# Patient Record
Sex: MALE | Race: WHITE | NOT HISPANIC OR LATINO | Employment: FULL TIME | URBAN - METROPOLITAN AREA
[De-identification: names, ages, dates, MRNs, and addresses within clinical notes are randomized per-mention and may not be internally consistent; named-entity substitution may affect disease eponyms.]

---

## 2023-11-06 ENCOUNTER — TELEMEDICINE (OUTPATIENT)
Dept: PRIMARY CARE | Facility: CLINIC | Age: 43
End: 2023-11-06
Payer: COMMERCIAL

## 2023-11-06 DIAGNOSIS — J01.10 ACUTE NON-RECURRENT FRONTAL SINUSITIS: Primary | ICD-10-CM

## 2023-11-06 PROCEDURE — 99442 PR PHYS/QHP TELEPHONE EVALUATION 11-20 MIN: CPT | Performed by: REGISTERED NURSE

## 2023-11-06 RX ORDER — HYDROCHLOROTHIAZIDE 25 MG/1
25 TABLET ORAL EVERY 24 HOURS
COMMUNITY
Start: 2023-04-20 | End: 2023-11-06 | Stop reason: WASHOUT

## 2023-11-06 RX ORDER — NADOLOL 40 MG/1
40 TABLET ORAL EVERY 24 HOURS
COMMUNITY
Start: 2021-12-22 | End: 2023-11-06 | Stop reason: WASHOUT

## 2023-11-06 ASSESSMENT — COLUMBIA-SUICIDE SEVERITY RATING SCALE - C-SSRS
1. IN THE PAST MONTH, HAVE YOU WISHED YOU WERE DEAD OR WISHED YOU COULD GO TO SLEEP AND NOT WAKE UP?: NO
2. HAVE YOU ACTUALLY HAD ANY THOUGHTS OF KILLING YOURSELF?: NO
6. HAVE YOU EVER DONE ANYTHING, STARTED TO DO ANYTHING, OR PREPARED TO DO ANYTHING TO END YOUR LIFE?: NO

## 2023-11-06 ASSESSMENT — PATIENT HEALTH QUESTIONNAIRE - PHQ9
SUM OF ALL RESPONSES TO PHQ9 QUESTIONS 1 AND 2: 0
2. FEELING DOWN, DEPRESSED OR HOPELESS: NOT AT ALL
1. LITTLE INTEREST OR PLEASURE IN DOING THINGS: NOT AT ALL

## 2023-11-06 ASSESSMENT — ENCOUNTER SYMPTOMS
LOSS OF SENSATION IN FEET: 0
SINUS PRESSURE: 1
DEPRESSION: 0
OCCASIONAL FEELINGS OF UNSTEADINESS: 0
COUGH: 1

## 2023-11-06 NOTE — PROGRESS NOTES
Subjective   Patient ID: Cirilo Yepez Jr. is a 43 y.o. male who presents for Sinusitis (Started Saturday with sinus pain pressure chest congestion productive cough. No fever).    Sinusitis  The current episode started in the past 7 days. The problem is unchanged. There has been no fever. His pain is at a severity of 2/10. The pain is mild. Associated symptoms include congestion, coughing and sinus pressure. Past treatments include nasal decongestants. The treatment provided no relief.        Review of Systems   HENT:  Positive for congestion and sinus pressure.    Respiratory:  Positive for cough.        Objective   There were no vitals taken for this visit.    Physical Exam  No exam, telehealth    Assessment/Plan   Problem List Items Addressed This Visit    None

## 2024-02-06 ENCOUNTER — OFFICE VISIT (OUTPATIENT)
Dept: PRIMARY CARE | Facility: CLINIC | Age: 44
End: 2024-02-06
Payer: COMMERCIAL

## 2024-02-06 VITALS
TEMPERATURE: 98.1 F | BODY MASS INDEX: 44.45 KG/M2 | WEIGHT: 315 LBS | SYSTOLIC BLOOD PRESSURE: 138 MMHG | OXYGEN SATURATION: 98 % | DIASTOLIC BLOOD PRESSURE: 82 MMHG | HEART RATE: 88 BPM | RESPIRATION RATE: 18 BRPM

## 2024-02-06 DIAGNOSIS — J30.2 SEASONAL ALLERGIES: Primary | ICD-10-CM

## 2024-02-06 PROBLEM — S80.10XA HEMATOMA OF LOWER EXTREMITY: Status: ACTIVE | Noted: 2024-02-06

## 2024-02-06 PROBLEM — T78.3XXA ANGIOEDEMA: Status: RESOLVED | Noted: 2024-02-06 | Resolved: 2024-02-06

## 2024-02-06 PROBLEM — Q66.6 VALGUS DEFORMITY OF BOTH FEET: Status: RESOLVED | Noted: 2024-02-06 | Resolved: 2024-02-06

## 2024-02-06 PROBLEM — I49.1 ATRIAL PREMATURE DEPOLARIZATION: Status: ACTIVE | Noted: 2024-02-06

## 2024-02-06 PROBLEM — I83.91 VARICOSE VEINS OF RIGHT LOWER EXTREMITY: Status: RESOLVED | Noted: 2024-02-06 | Resolved: 2024-02-06

## 2024-02-06 PROBLEM — K42.0 INCARCERATED UMBILICAL HERNIA: Status: RESOLVED | Noted: 2024-02-06 | Resolved: 2024-02-06

## 2024-02-06 PROBLEM — M17.11 OSTEOARTHRITIS OF RIGHT KNEE: Status: RESOLVED | Noted: 2024-02-06 | Resolved: 2024-02-06

## 2024-02-06 PROBLEM — E78.00 PURE HYPERCHOLESTEROLEMIA: Status: ACTIVE | Noted: 2024-02-06

## 2024-02-06 PROBLEM — M23.40 LOOSE BODY OF KNEE: Status: ACTIVE | Noted: 2024-02-06

## 2024-02-06 PROBLEM — I82.90 VENOUS THROMBOSIS: Status: ACTIVE | Noted: 2024-02-06

## 2024-02-06 PROBLEM — E66.01 MORBID OBESITY (MULTI): Status: RESOLVED | Noted: 2024-02-06 | Resolved: 2024-02-06

## 2024-02-06 PROBLEM — S86.111A: Status: RESOLVED | Noted: 2024-02-06 | Resolved: 2024-02-06

## 2024-02-06 PROBLEM — J32.9 CHRONIC SINUSITIS: Status: RESOLVED | Noted: 2024-02-06 | Resolved: 2024-02-06

## 2024-02-06 PROBLEM — I20.9 ANGINA PECTORIS (CMS-HCC): Status: ACTIVE | Noted: 2024-02-06

## 2024-02-06 PROBLEM — I47.10 SUPRAVENTRICULAR TACHYCARDIA (CMS-HCC): Status: ACTIVE | Noted: 2024-02-06

## 2024-02-06 PROBLEM — M94.20 CHONDROMALACIA: Status: RESOLVED | Noted: 2024-02-06 | Resolved: 2024-02-06

## 2024-02-06 PROBLEM — S96.811A: Status: RESOLVED | Noted: 2024-02-06 | Resolved: 2024-02-06

## 2024-02-06 PROBLEM — G47.30 BREATHING-RELATED SLEEP DISORDER: Status: RESOLVED | Noted: 2024-02-06 | Resolved: 2024-02-06

## 2024-02-06 PROCEDURE — 99212 OFFICE O/P EST SF 10 MIN: CPT | Performed by: REGISTERED NURSE

## 2024-02-06 PROCEDURE — 1036F TOBACCO NON-USER: CPT | Performed by: REGISTERED NURSE

## 2024-02-06 PROCEDURE — 2500000004 HC RX 250 GENERAL PHARMACY W/ HCPCS (ALT 636 FOR OP/ED): Performed by: REGISTERED NURSE

## 2024-02-06 PROCEDURE — 96372 THER/PROPH/DIAG INJ SC/IM: CPT | Performed by: REGISTERED NURSE

## 2024-02-06 RX ORDER — CYCLOBENZAPRINE HCL 10 MG
TABLET ORAL EVERY 24 HOURS
COMMUNITY
Start: 2022-06-28 | End: 2024-02-20 | Stop reason: WASHOUT

## 2024-02-06 RX ORDER — TIZANIDINE 4 MG/1
4 TABLET ORAL EVERY 8 HOURS
COMMUNITY
Start: 2022-11-19 | End: 2024-02-20 | Stop reason: WASHOUT

## 2024-02-06 RX ORDER — TRIAMCINOLONE ACETONIDE 40 MG/ML
40 INJECTION, SUSPENSION INTRA-ARTICULAR; INTRAMUSCULAR ONCE
Status: COMPLETED | OUTPATIENT
Start: 2024-02-06 | End: 2024-02-06

## 2024-02-06 RX ADMIN — TRIAMCINOLONE ACETONIDE 40 MG: 40 INJECTION, SUSPENSION INTRA-ARTICULAR; INTRAMUSCULAR at 13:15

## 2024-02-06 ASSESSMENT — PATIENT HEALTH QUESTIONNAIRE - PHQ9
2. FEELING DOWN, DEPRESSED OR HOPELESS: NOT AT ALL
SUM OF ALL RESPONSES TO PHQ9 QUESTIONS 1 AND 2: 0
1. LITTLE INTEREST OR PLEASURE IN DOING THINGS: NOT AT ALL

## 2024-02-06 ASSESSMENT — ENCOUNTER SYMPTOMS
LOSS OF SENSATION IN FEET: 0
SINUS PRESSURE: 1
DEPRESSION: 0
OCCASIONAL FEELINGS OF UNSTEADINESS: 0

## 2024-02-06 ASSESSMENT — COLUMBIA-SUICIDE SEVERITY RATING SCALE - C-SSRS
2. HAVE YOU ACTUALLY HAD ANY THOUGHTS OF KILLING YOURSELF?: NO
6. HAVE YOU EVER DONE ANYTHING, STARTED TO DO ANYTHING, OR PREPARED TO DO ANYTHING TO END YOUR LIFE?: NO
1. IN THE PAST MONTH, HAVE YOU WISHED YOU WERE DEAD OR WISHED YOU COULD GO TO SLEEP AND NOT WAKE UP?: NO

## 2024-02-06 ASSESSMENT — PAIN SCALES - GENERAL: PAINLEVEL: 0-NO PAIN

## 2024-02-06 NOTE — PROGRESS NOTES
Subjective   Patient ID: Cirilo Yepez Jr. is a 43 y.o. male who presents for No chief complaint on file..    Seasonal allergies         Review of Systems   HENT:  Positive for sinus pressure.    All other systems reviewed and are negative.      Objective   /82   Pulse 88   Temp 36.7 °C (98.1 °F)   Resp 18   Wt (!) 157 kg (346 lb)   SpO2 98%   BMI 44.45 kg/m²     Physical Exam  Vitals reviewed.   HENT:      Nose: Rhinorrhea present.   Psychiatric:         Mood and Affect: Mood normal.         Behavior: Behavior normal.         Assessment/Plan   Problem List Items Addressed This Visit    None

## 2024-02-14 ENCOUNTER — PHARMACY VISIT (OUTPATIENT)
Dept: PHARMACY | Facility: CLINIC | Age: 44
End: 2024-02-14
Payer: COMMERCIAL

## 2024-02-14 PROCEDURE — RXMED WILLOW AMBULATORY MEDICATION CHARGE

## 2024-02-14 RX ORDER — AZITHROMYCIN 250 MG/1
TABLET, FILM COATED ORAL
Qty: 6 TABLET | Refills: 0 | OUTPATIENT
Start: 2024-02-14 | End: 2024-02-20 | Stop reason: WASHOUT

## 2024-02-20 ENCOUNTER — OFFICE VISIT (OUTPATIENT)
Dept: PRIMARY CARE | Facility: CLINIC | Age: 44
End: 2024-02-20
Payer: COMMERCIAL

## 2024-02-20 VITALS
WEIGHT: 315 LBS | BODY MASS INDEX: 41.75 KG/M2 | DIASTOLIC BLOOD PRESSURE: 86 MMHG | TEMPERATURE: 97.6 F | HEIGHT: 73 IN | SYSTOLIC BLOOD PRESSURE: 124 MMHG | OXYGEN SATURATION: 97 % | RESPIRATION RATE: 18 BRPM | HEART RATE: 74 BPM

## 2024-02-20 DIAGNOSIS — G89.29 CHRONIC RIGHT-SIDED LOW BACK PAIN WITH RIGHT-SIDED SCIATICA: ICD-10-CM

## 2024-02-20 DIAGNOSIS — M54.41 CHRONIC RIGHT-SIDED LOW BACK PAIN WITH RIGHT-SIDED SCIATICA: ICD-10-CM

## 2024-02-20 DIAGNOSIS — I49.1 ATRIAL PREMATURE DEPOLARIZATION: ICD-10-CM

## 2024-02-20 DIAGNOSIS — I47.10 SUPRAVENTRICULAR TACHYCARDIA (CMS-HCC): ICD-10-CM

## 2024-02-20 DIAGNOSIS — Z00.00 ROUTINE GENERAL MEDICAL EXAMINATION AT A HEALTH CARE FACILITY: Primary | ICD-10-CM

## 2024-02-20 DIAGNOSIS — E78.00 PURE HYPERCHOLESTEROLEMIA: ICD-10-CM

## 2024-02-20 PROBLEM — S80.10XA HEMATOMA OF LOWER EXTREMITY: Status: RESOLVED | Noted: 2024-02-06 | Resolved: 2024-02-20

## 2024-02-20 LAB
ALBUMIN SERPL-MCNC: 4.2 G/DL (ref 3.5–5)
ALP BLD-CCNC: 80 U/L (ref 35–125)
ALT SERPL-CCNC: 24 U/L (ref 5–40)
ANION GAP SERPL CALC-SCNC: 11 MMOL/L
AST SERPL-CCNC: 19 U/L (ref 5–40)
BASOPHILS # BLD AUTO: 0.05 X10*3/UL (ref 0–0.1)
BASOPHILS NFR BLD AUTO: 0.7 %
BILIRUB SERPL-MCNC: 0.5 MG/DL (ref 0.1–1.2)
BUN SERPL-MCNC: 14 MG/DL (ref 8–25)
CALCIUM SERPL-MCNC: 9.6 MG/DL (ref 8.5–10.4)
CHLORIDE SERPL-SCNC: 99 MMOL/L (ref 97–107)
CHOLEST SERPL-MCNC: 159 MG/DL (ref 133–200)
CHOLEST/HDLC SERPL: 4.7 {RATIO}
CO2 SERPL-SCNC: 28 MMOL/L (ref 24–31)
CREAT SERPL-MCNC: 0.9 MG/DL (ref 0.4–1.6)
EGFRCR SERPLBLD CKD-EPI 2021: >90 ML/MIN/1.73M*2
EOSINOPHIL # BLD AUTO: 0.12 X10*3/UL (ref 0–0.7)
EOSINOPHIL NFR BLD AUTO: 1.8 %
ERYTHROCYTE [DISTWIDTH] IN BLOOD BY AUTOMATED COUNT: 12.3 % (ref 11.5–14.5)
GLUCOSE SERPL-MCNC: 126 MG/DL (ref 65–99)
HCT VFR BLD AUTO: 45.6 % (ref 41–52)
HDLC SERPL-MCNC: 34 MG/DL
HGB BLD-MCNC: 14.8 G/DL (ref 13.5–17.5)
IMM GRANULOCYTES # BLD AUTO: 0.02 X10*3/UL (ref 0–0.7)
IMM GRANULOCYTES NFR BLD AUTO: 0.3 % (ref 0–0.9)
LDLC SERPL CALC-MCNC: 111 MG/DL (ref 65–130)
LYMPHOCYTES # BLD AUTO: 1.91 X10*3/UL (ref 1.2–4.8)
LYMPHOCYTES NFR BLD AUTO: 28.1 %
MCH RBC QN AUTO: 28.7 PG (ref 26–34)
MCHC RBC AUTO-ENTMCNC: 32.5 G/DL (ref 32–36)
MCV RBC AUTO: 88 FL (ref 80–100)
MONOCYTES # BLD AUTO: 0.45 X10*3/UL (ref 0.1–1)
MONOCYTES NFR BLD AUTO: 6.6 %
NEUTROPHILS # BLD AUTO: 4.25 X10*3/UL (ref 1.2–7.7)
NEUTROPHILS NFR BLD AUTO: 62.5 %
NRBC BLD-RTO: 0 /100 WBCS (ref 0–0)
PLATELET # BLD AUTO: 270 X10*3/UL (ref 150–450)
POTASSIUM SERPL-SCNC: 4.5 MMOL/L (ref 3.4–5.1)
PROT SERPL-MCNC: 7.1 G/DL (ref 5.9–7.9)
RBC # BLD AUTO: 5.16 X10*6/UL (ref 4.5–5.9)
SODIUM SERPL-SCNC: 138 MMOL/L (ref 133–145)
TRIGL SERPL-MCNC: 71 MG/DL (ref 40–150)
WBC # BLD AUTO: 6.8 X10*3/UL (ref 4.4–11.3)

## 2024-02-20 PROCEDURE — 85025 COMPLETE CBC W/AUTO DIFF WBC: CPT | Performed by: REGISTERED NURSE

## 2024-02-20 PROCEDURE — 80061 LIPID PANEL: CPT | Performed by: REGISTERED NURSE

## 2024-02-20 PROCEDURE — 80053 COMPREHEN METABOLIC PANEL: CPT | Performed by: REGISTERED NURSE

## 2024-02-20 PROCEDURE — 36415 COLL VENOUS BLD VENIPUNCTURE: CPT | Performed by: REGISTERED NURSE

## 2024-02-20 PROCEDURE — 1036F TOBACCO NON-USER: CPT | Performed by: REGISTERED NURSE

## 2024-02-20 PROCEDURE — 99396 PREV VISIT EST AGE 40-64: CPT | Performed by: REGISTERED NURSE

## 2024-02-20 RX ORDER — HYDROCHLOROTHIAZIDE 25 MG/1
25 TABLET ORAL DAILY
COMMUNITY
Start: 2023-04-20 | End: 2024-02-20 | Stop reason: WASHOUT

## 2024-02-20 ASSESSMENT — ENCOUNTER SYMPTOMS
LOSS OF SENSATION IN FEET: 0
DEPRESSION: 0
OCCASIONAL FEELINGS OF UNSTEADINESS: 0

## 2024-02-20 ASSESSMENT — COLUMBIA-SUICIDE SEVERITY RATING SCALE - C-SSRS
6. HAVE YOU EVER DONE ANYTHING, STARTED TO DO ANYTHING, OR PREPARED TO DO ANYTHING TO END YOUR LIFE?: NO
1. IN THE PAST MONTH, HAVE YOU WISHED YOU WERE DEAD OR WISHED YOU COULD GO TO SLEEP AND NOT WAKE UP?: NO
2. HAVE YOU ACTUALLY HAD ANY THOUGHTS OF KILLING YOURSELF?: NO

## 2024-02-20 ASSESSMENT — PAIN SCALES - GENERAL: PAINLEVEL: 0-NO PAIN

## 2024-02-20 NOTE — PROGRESS NOTES
"Subjective   Patient ID: Cirilo Yepez Jr. is a 43 y.o. male who presents for Annual Exam (Pt is fasting).    Pt here for PE and BW he also has low back pain from a previous injury.       Pt will be leaving practice to change to [employer] as wife just got job there  Review of Systems   All other systems reviewed and are negative.      Objective   /86   Pulse 74   Temp 36.4 °C (97.6 °F)   Resp 18   Ht 1.854 m (6' 1\")   Wt 149 kg (329 lb 6.4 oz)   SpO2 97%   BMI 43.46 kg/m²     Physical Exam  Vitals reviewed.   Constitutional:       Appearance: Normal appearance.   HENT:      Head: Normocephalic and atraumatic.      Right Ear: Tympanic membrane, ear canal and external ear normal.      Left Ear: Tympanic membrane, ear canal and external ear normal.      Nose: Nose normal.      Mouth/Throat:      Mouth: Mucous membranes are moist.   Eyes:      Extraocular Movements: Extraocular movements intact.      Pupils: Pupils are equal, round, and reactive to light.   Cardiovascular:      Rate and Rhythm: Normal rate and regular rhythm.      Pulses: Normal pulses.      Heart sounds: Normal heart sounds.   Pulmonary:      Effort: Pulmonary effort is normal.      Breath sounds: Normal breath sounds.   Abdominal:      General: Bowel sounds are normal.      Palpations: Abdomen is soft.   Musculoskeletal:         General: Normal range of motion.      Cervical back: Normal range of motion and neck supple.   Skin:     General: Skin is warm and dry.      Capillary Refill: Capillary refill takes less than 2 seconds.   Neurological:      General: No focal deficit present.      Mental Status: He is alert and oriented to person, place, and time.   Psychiatric:         Mood and Affect: Mood normal.         Behavior: Behavior normal.         Assessment/Plan   Problem List Items Addressed This Visit             ICD-10-CM    Angina pectoris (CMS/HCC) I20.9    Pure hypercholesterolemia E78.00    Atrial premature depolarization I49.1    " Supraventricular tachycardia I47.10     Other Visit Diagnoses         Codes    Routine general medical examination at a health care facility    -  Primary Z00.00    Chronic right-sided low back pain with right-sided sciatica     M54.41, G89.29

## 2024-03-13 ENCOUNTER — HOSPITAL ENCOUNTER (OUTPATIENT)
Dept: RADIOLOGY | Facility: CLINIC | Age: 44
Discharge: HOME | End: 2024-03-13
Payer: COMMERCIAL

## 2024-03-13 DIAGNOSIS — M54.41 CHRONIC RIGHT-SIDED LOW BACK PAIN WITH RIGHT-SIDED SCIATICA: ICD-10-CM

## 2024-03-13 DIAGNOSIS — G89.29 CHRONIC RIGHT-SIDED LOW BACK PAIN WITH RIGHT-SIDED SCIATICA: ICD-10-CM

## 2024-03-13 PROCEDURE — 72110 X-RAY EXAM L-2 SPINE 4/>VWS: CPT | Performed by: RADIOLOGY

## 2024-03-13 PROCEDURE — 72110 X-RAY EXAM L-2 SPINE 4/>VWS: CPT

## 2024-03-18 ENCOUNTER — PHARMACY VISIT (OUTPATIENT)
Dept: PHARMACY | Facility: CLINIC | Age: 44
End: 2024-03-18
Payer: COMMERCIAL

## 2024-03-18 ENCOUNTER — APPOINTMENT (OUTPATIENT)
Dept: RADIOLOGY | Facility: HOSPITAL | Age: 44
End: 2024-03-18
Payer: COMMERCIAL

## 2024-03-18 ENCOUNTER — HOSPITAL ENCOUNTER (EMERGENCY)
Facility: HOSPITAL | Age: 44
Discharge: HOME | End: 2024-03-18
Attending: FAMILY MEDICINE
Payer: COMMERCIAL

## 2024-03-18 VITALS
BODY MASS INDEX: 40.43 KG/M2 | OXYGEN SATURATION: 95 % | TEMPERATURE: 96.7 F | SYSTOLIC BLOOD PRESSURE: 132 MMHG | WEIGHT: 315 LBS | HEIGHT: 74 IN | DIASTOLIC BLOOD PRESSURE: 81 MMHG | HEART RATE: 83 BPM | RESPIRATION RATE: 18 BRPM

## 2024-03-18 DIAGNOSIS — M54.42 ACUTE RIGHT-SIDED LOW BACK PAIN WITH BILATERAL SCIATICA: ICD-10-CM

## 2024-03-18 DIAGNOSIS — M54.41 ACUTE RIGHT-SIDED LOW BACK PAIN WITH BILATERAL SCIATICA: ICD-10-CM

## 2024-03-18 DIAGNOSIS — M54.16 LUMBAR RADICULOPATHY, ACUTE: Primary | ICD-10-CM

## 2024-03-18 DIAGNOSIS — M51.9 LUMBAR DISC DISEASE: ICD-10-CM

## 2024-03-18 LAB
ALBUMIN SERPL BCP-MCNC: 4.4 G/DL (ref 3.4–5)
ALP SERPL-CCNC: 78 U/L (ref 33–120)
ALT SERPL W P-5'-P-CCNC: 21 U/L (ref 10–52)
ANION GAP SERPL CALC-SCNC: 10 MMOL/L (ref 10–20)
AST SERPL W P-5'-P-CCNC: 16 U/L (ref 9–39)
BASOPHILS # BLD AUTO: 0.06 X10*3/UL (ref 0–0.1)
BASOPHILS NFR BLD AUTO: 0.6 %
BILIRUB SERPL-MCNC: 0.7 MG/DL (ref 0–1.2)
BUN SERPL-MCNC: 13 MG/DL (ref 6–23)
CALCIUM SERPL-MCNC: 9.5 MG/DL (ref 8.6–10.3)
CHLORIDE SERPL-SCNC: 102 MMOL/L (ref 98–107)
CO2 SERPL-SCNC: 32 MMOL/L (ref 21–32)
CREAT SERPL-MCNC: 0.93 MG/DL (ref 0.5–1.3)
EGFRCR SERPLBLD CKD-EPI 2021: >90 ML/MIN/1.73M*2
EOSINOPHIL # BLD AUTO: 0.15 X10*3/UL (ref 0–0.7)
EOSINOPHIL NFR BLD AUTO: 1.4 %
ERYTHROCYTE [DISTWIDTH] IN BLOOD BY AUTOMATED COUNT: 12.7 % (ref 11.5–14.5)
GLUCOSE SERPL-MCNC: 115 MG/DL (ref 74–99)
HCT VFR BLD AUTO: 47.3 % (ref 41–52)
HGB BLD-MCNC: 15.6 G/DL (ref 13.5–17.5)
IMM GRANULOCYTES # BLD AUTO: 0.04 X10*3/UL (ref 0–0.7)
IMM GRANULOCYTES NFR BLD AUTO: 0.4 % (ref 0–0.9)
LACTATE SERPL-SCNC: 1.2 MMOL/L (ref 0.4–2)
LYMPHOCYTES # BLD AUTO: 2.06 X10*3/UL (ref 1.2–4.8)
LYMPHOCYTES NFR BLD AUTO: 19 %
MCH RBC QN AUTO: 29.1 PG (ref 26–34)
MCHC RBC AUTO-ENTMCNC: 33 G/DL (ref 32–36)
MCV RBC AUTO: 88 FL (ref 80–100)
MONOCYTES # BLD AUTO: 0.54 X10*3/UL (ref 0.1–1)
MONOCYTES NFR BLD AUTO: 5 %
NEUTROPHILS # BLD AUTO: 8.01 X10*3/UL (ref 1.2–7.7)
NEUTROPHILS NFR BLD AUTO: 73.6 %
NRBC BLD-RTO: 0 /100 WBCS (ref 0–0)
PLATELET # BLD AUTO: 219 X10*3/UL (ref 150–450)
POTASSIUM SERPL-SCNC: 3.8 MMOL/L (ref 3.5–5.3)
PROT SERPL-MCNC: 7.6 G/DL (ref 6.4–8.2)
RBC # BLD AUTO: 5.36 X10*6/UL (ref 4.5–5.9)
SODIUM SERPL-SCNC: 140 MMOL/L (ref 136–145)
WBC # BLD AUTO: 10.9 X10*3/UL (ref 4.4–11.3)

## 2024-03-18 PROCEDURE — 85025 COMPLETE CBC W/AUTO DIFF WBC: CPT | Performed by: FAMILY MEDICINE

## 2024-03-18 PROCEDURE — 72131 CT LUMBAR SPINE W/O DYE: CPT | Mod: RCN | Performed by: RADIOLOGY

## 2024-03-18 PROCEDURE — 36415 COLL VENOUS BLD VENIPUNCTURE: CPT | Performed by: FAMILY MEDICINE

## 2024-03-18 PROCEDURE — 74176 CT ABD & PELVIS W/O CONTRAST: CPT

## 2024-03-18 PROCEDURE — RXMED WILLOW AMBULATORY MEDICATION CHARGE

## 2024-03-18 PROCEDURE — 80053 COMPREHEN METABOLIC PANEL: CPT | Performed by: FAMILY MEDICINE

## 2024-03-18 PROCEDURE — 96374 THER/PROPH/DIAG INJ IV PUSH: CPT

## 2024-03-18 PROCEDURE — 96375 TX/PRO/DX INJ NEW DRUG ADDON: CPT

## 2024-03-18 PROCEDURE — 2500000004 HC RX 250 GENERAL PHARMACY W/ HCPCS (ALT 636 FOR OP/ED): Performed by: FAMILY MEDICINE

## 2024-03-18 PROCEDURE — 74176 CT ABD & PELVIS W/O CONTRAST: CPT | Mod: RCN | Performed by: RADIOLOGY

## 2024-03-18 PROCEDURE — 72131 CT LUMBAR SPINE W/O DYE: CPT | Mod: RCN

## 2024-03-18 PROCEDURE — 96361 HYDRATE IV INFUSION ADD-ON: CPT

## 2024-03-18 PROCEDURE — RXOTC WILLOW AMBULATORY OTC CHARGE

## 2024-03-18 PROCEDURE — 99285 EMERGENCY DEPT VISIT HI MDM: CPT | Mod: 25

## 2024-03-18 PROCEDURE — 83605 ASSAY OF LACTIC ACID: CPT | Performed by: FAMILY MEDICINE

## 2024-03-18 RX ORDER — KETOROLAC TROMETHAMINE 30 MG/ML
30 INJECTION, SOLUTION INTRAMUSCULAR; INTRAVENOUS ONCE
Status: COMPLETED | OUTPATIENT
Start: 2024-03-18 | End: 2024-03-18

## 2024-03-18 RX ORDER — ONDANSETRON HYDROCHLORIDE 2 MG/ML
4 INJECTION, SOLUTION INTRAVENOUS ONCE
Status: COMPLETED | OUTPATIENT
Start: 2024-03-18 | End: 2024-03-18

## 2024-03-18 RX ORDER — KETOROLAC TROMETHAMINE 15 MG/ML
15 INJECTION, SOLUTION INTRAMUSCULAR; INTRAVENOUS ONCE
Status: DISCONTINUED | OUTPATIENT
Start: 2024-03-18 | End: 2024-03-18

## 2024-03-18 RX ORDER — KETOROLAC TROMETHAMINE 10 MG/1
10 TABLET, FILM COATED ORAL EVERY 6 HOURS PRN
Qty: 20 TABLET | Refills: 0 | Status: SHIPPED | OUTPATIENT
Start: 2024-03-18 | End: 2024-03-23

## 2024-03-18 RX ADMIN — SODIUM CHLORIDE 500 ML: 9 INJECTION, SOLUTION INTRAVENOUS at 11:02

## 2024-03-18 RX ADMIN — KETOROLAC TROMETHAMINE 30 MG: 30 INJECTION, SOLUTION INTRAMUSCULAR at 11:16

## 2024-03-18 RX ADMIN — ONDANSETRON 4 MG: 2 INJECTION INTRAMUSCULAR; INTRAVENOUS at 11:16

## 2024-03-18 ASSESSMENT — PAIN DESCRIPTION - LOCATION
LOCATION: BACK
LOCATION: BACK

## 2024-03-18 ASSESSMENT — PAIN - FUNCTIONAL ASSESSMENT
PAIN_FUNCTIONAL_ASSESSMENT: 0-10
PAIN_FUNCTIONAL_ASSESSMENT: 0-10

## 2024-03-18 ASSESSMENT — PAIN DESCRIPTION - PAIN TYPE: TYPE: ACUTE PAIN

## 2024-03-18 ASSESSMENT — PAIN DESCRIPTION - ORIENTATION: ORIENTATION: LOWER

## 2024-03-18 ASSESSMENT — PAIN DESCRIPTION - PROGRESSION: CLINICAL_PROGRESSION: GRADUALLY WORSENING

## 2024-03-18 ASSESSMENT — PAIN SCALES - GENERAL: PAINLEVEL_OUTOF10: 10 - WORST POSSIBLE PAIN

## 2024-03-18 ASSESSMENT — PAIN DESCRIPTION - ONSET: ONSET: SUDDEN

## 2024-03-18 ASSESSMENT — PAIN DESCRIPTION - FREQUENCY: FREQUENCY: CONSTANT/CONTINUOUS

## 2024-03-18 ASSESSMENT — PAIN DESCRIPTION - DESCRIPTORS: DESCRIPTORS: SHARP

## 2024-03-18 NOTE — ED PROVIDER NOTES
HPI   No chief complaint on file.      HPI  This 44-year-old male patient came to the emergency room with complaint of severe low back pain, patient    He fell about 1 step down about a week ago fell down landing on low back and suffered low back injury, patient had low back x-ray done as outpatient which she think reported no fracture.  Continue pain however apparently symptoms got worse when he was getting up 90 basketed and Dr. Mccarty severe low back pain pain rating to buttocks area bilaterally he cannot move around because of severe COPD and even move the lower extremity causes severe pain in the low back and decided come to ER for hydration.  He has been taking Flexeril without improvement.  Denies any abdominal pain headache or neck stiffness chest pain or short of breath or fever or chills.  Denies income stool or urine.  Denies specimen from history of AAA or dissection or kidney stone.  Denies UTI symptoms.  He denies history of chronic back problem.  Urination has been good.  Denies history of peptic ulcers or renal problem.  He does not use any anti-inflammatory but denies any contraindication for inflammatory.    Family history: Reviewed  Social history: Reviewed, denies substance abuse.  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  Review of system: 10 review of system obtained review of system as described in HPI otherwise negative.            No data recorded                   Patient History   Past Medical History:   Diagnosis Date    Angioedema 02/06/2024    Breathing-related sleep disorder 02/06/2024    Chondromalacia 02/06/2024    Chronic sinusitis 02/06/2024    Hematoma of lower extremity 02/06/2024    Incarcerated umbilical hernia 02/06/2024    Morbid obesity (CMS/HCC) 02/06/2024    Osteoarthritis of right knee 02/06/2024    Rupture of right plantaris tendon 02/06/2024    Strain of right gastrocnemius muscle or tendon 02/06/2024    Valgus deformity of both feet 02/06/2024    Varicose  veins of right lower extremity 02/06/2024     Past Surgical History:   Procedure Laterality Date    OTHER SURGICAL HISTORY  12/28/2020    Shoulder surgery     Family History   Problem Relation Name Age of Onset    No Known Problems Mother      Heart disease Father      Diabetes Father      Diabetes Brother       Social History     Tobacco Use    Smoking status: Never     Passive exposure: Never    Smokeless tobacco: Never   Vaping Use    Vaping Use: Never used   Substance Use Topics    Alcohol use: Yes     Alcohol/week: 1.0 standard drink of alcohol     Types: 1 Standard drinks or equivalent per week    Drug use: Never   EKG obtained at 19 hours showed normal sinus rhythm with sinus arrhythmia rate of 77.  Nonspecific ST abnormality.  No ST-T evaluation.  No STEMI.  Abnormal EKG RI at this EKG.    Physical Exam   ED Triage Vitals   Temp Pulse Resp BP   -- -- -- --      SpO2 Temp src Heart Rate Source Patient Position   -- -- -- --      BP Location FiO2 (%)     -- --       Physical Exam  Constitutional:       Appearance: Normal appearance.      Comments: Patient is a well-developed well-nourished tall big beard male patient appears in moderate pain to be severe low back pain with spasm.  Any movement of the low back change of body posture, severe low back pain.  He was awake alert oriented x 3.  Talking breathing without acute distress or distress.   HENT:      Head: Normocephalic and atraumatic.      Right Ear: External ear normal.      Left Ear: External ear normal.      Nose: Nose normal. No congestion or rhinorrhea.   Eyes:      Extraocular Movements: Extraocular movements intact.      Conjunctiva/sclera: Conjunctivae normal.      Pupils: Pupils are equal, round, and reactive to light.   Neck:      Comments: Cervical thoracic and lumbar spine nontender he was noted severe low back spasm type pain.  Neck was supple.  Cardiovascular:      Rate and Rhythm: Normal rate and regular rhythm.      Pulses: Normal pulses.       Heart sounds: Normal heart sounds.      Comments: Regular rate and rhythm.  No friction rub no murmur no peripheral edema good peripheral pulses calf is nontender Homans' sign negative good skin perfusion.  Pulmonary:      Effort: Pulmonary effort is normal.      Breath sounds: Normal breath sounds.      Comments: Clear breath sounds bilaterally without wheezing rales rhonchi no tachypnea hypoxemia respite/talking breathing comfortably.  Abdominal:      General: Abdomen is flat. Bowel sounds are normal.      Palpations: Abdomen is soft.      Comments: Abdomen mildly obese positive bowel sound nontender no guarding rebound could not appreciate a pulsatile mass.   Musculoskeletal:      Cervical back: Normal range of motion and neck supple.      Comments: Noted a marked bronchospasm and pain in the low back spine nontender neck was supple and remove the lower part of the body because pain he was able to flex and extend his hip any with severe low back pain.  Dorsiflexion improved to 3+.  Both upper lower extremity good motion nontender except   Skin:     General: Skin is warm.      Capillary Refill: Capillary refill takes less than 2 seconds.      Comments: Normal skin turgor no ecchymosis petechia or red streak.   Neurological:      General: No focal deficit present.      Mental Status: He is alert and oriented to person, place, and time.      Comments: Neuro examination grossly within normal she was in moderate pain with low back spasm and pain with range of motion lower part of the body.  Spine nontender neck was supple both upper extremity good motion nontender dorsiflexion plantar foot and toes.  No nuchal rigidity.  Cranial nerves II through XII gross intact.  Limited examination reveals no cerebellar examination abnormality   Psychiatric:         Mood and Affect: Mood normal.         Behavior: Behavior normal.         ED Course & MDM   Diagnoses as of 03/18/24 1357   Lumbar disc disease   Lumbar  radiculopathy, acute   Acute right-sided low back pain with bilateral sciatica       Medical Decision Making  Patient who fell off 1 step suffered low back pain however got further aggravated when he was carrying laundry yesterday with severe low back pain she is treated with Botox.  Denies income stool or urine or any headache or neck stiffness fever chills cough nausea vomiting diarrhea.  Upon examination he was in moderate to severe.  Marked low back spasm and however cervical thoracic and lumbar spine nontender neck was supple lungs are clear heart regular rate and neurovascularly abdomen is obese soft nontender.  Range of motion hip and pelvic is initially limited due to patient being triaged and subsequently evaluated and his examination room and it was noted good range of motion legs intact with pulse intact sensation.  Patient's patient was in severe pain and required Toradol shot and we are repeating Flexeril.  CT of the abdomen pelvis without contrast CT of the lumbar spine reformatted images showed finding consistent with L5-S1 disc disease and some other incidental finding without any acute fracture or subluxation.  No acute intra-abdominal pelvic abnormality reported.    Patient is feeling much better after he has been given Toradol initially was denying pain but after he went to sleep following Toradol shot is feeling better still has some residual lumbar radiculopathy symptoms with history of Toradol to use only and do not use with ibuprofen or Naprosyn type products.  Continue Flexeril and follow-up with primary care physician and orthopedic surgery.  See transfer note.  If any problem concern return to ER.        Procedure  Procedures     Bryson Bermudez MD  03/18/24 2758

## 2024-03-19 ENCOUNTER — OFFICE VISIT (OUTPATIENT)
Dept: ORTHOPEDIC SURGERY | Facility: CLINIC | Age: 44
End: 2024-03-19
Payer: COMMERCIAL

## 2024-03-19 ENCOUNTER — PHARMACY VISIT (OUTPATIENT)
Dept: PHARMACY | Facility: CLINIC | Age: 44
End: 2024-03-19
Payer: COMMERCIAL

## 2024-03-19 ENCOUNTER — EVALUATION (OUTPATIENT)
Dept: PHYSICAL THERAPY | Facility: HOSPITAL | Age: 44
End: 2024-03-19
Payer: COMMERCIAL

## 2024-03-19 ENCOUNTER — APPOINTMENT (OUTPATIENT)
Dept: PRIMARY CARE | Facility: CLINIC | Age: 44
End: 2024-03-19
Payer: COMMERCIAL

## 2024-03-19 DIAGNOSIS — M54.50 ACUTE BILATERAL LOW BACK PAIN WITHOUT SCIATICA: Primary | ICD-10-CM

## 2024-03-19 PROCEDURE — 97110 THERAPEUTIC EXERCISES: CPT | Mod: GP | Performed by: PHYSICAL THERAPIST

## 2024-03-19 PROCEDURE — RXMED WILLOW AMBULATORY MEDICATION CHARGE

## 2024-03-19 PROCEDURE — 97161 PT EVAL LOW COMPLEX 20 MIN: CPT | Mod: GP | Performed by: PHYSICAL THERAPIST

## 2024-03-19 PROCEDURE — 99204 OFFICE O/P NEW MOD 45 MIN: CPT | Performed by: PHYSICIAN ASSISTANT

## 2024-03-19 RX ORDER — METHOCARBAMOL 500 MG/1
TABLET, FILM COATED ORAL
Qty: 60 TABLET | Refills: 2 | Status: SHIPPED | OUTPATIENT
Start: 2024-03-19

## 2024-03-19 RX ORDER — MELOXICAM 15 MG/1
15 TABLET ORAL DAILY
Qty: 30 TABLET | Refills: 3 | Status: SHIPPED | OUTPATIENT
Start: 2024-03-19

## 2024-03-19 ASSESSMENT — ENCOUNTER SYMPTOMS
OCCASIONAL FEELINGS OF UNSTEADINESS: 0
LOSS OF SENSATION IN FEET: 0
DEPRESSION: 0

## 2024-03-19 ASSESSMENT — PAIN SCALES - GENERAL: PAINLEVEL_OUTOF10: 7

## 2024-03-19 ASSESSMENT — PATIENT HEALTH QUESTIONNAIRE - PHQ9
SUM OF ALL RESPONSES TO PHQ9 QUESTIONS 1 AND 2: 0
1. LITTLE INTEREST OR PLEASURE IN DOING THINGS: NOT AT ALL
2. FEELING DOWN, DEPRESSED OR HOPELESS: NOT AT ALL

## 2024-03-19 ASSESSMENT — PAIN - FUNCTIONAL ASSESSMENT: PAIN_FUNCTIONAL_ASSESSMENT: 0-10

## 2024-03-19 NOTE — PROGRESS NOTES
Physical Therapy  Physical Therapy Orthopedic Evaluation    Patient Name: Cirilo Yepez Jr.  MRN: [id number]  Today's Date: 3/19/2024  Time Calculation  Start Time: 1346  Stop Time: 1428  Time Calculation (min): 42 min    Today's Charges  PT Evaluation Time Entry  PT Evaluation (Low) Time Entry: 22  PT Therapeutic Procedures Time Entry  Therapeutic Exercise Time Entry: 20      Insurance:  Visit number: 1 of 30  Authorization info: No authorization required  Insurance Type: [facility]    Current Problem  1. Acute bilateral low back pain without sciatica  Referral to Physical Therapy    Follow Up In Physical Therapy          General:  General  Reason for Referral: LBP  Referred By: Nisreen Valentin PA-C  Past Medical History Relevant to Rehab: L shoulder surgery around 2005  Preferred Learning Style: verbal, visual, written      Precautions:   Precautions  STEADI Fall Risk Score (The score of 4 or more indicates an increased risk of falling): 5  Medical Precautions: Fall precautions    Medical History Form: Reviewed (scanned into chart)    Subjective:   Subjective   Chief Complaint: Patient presents to clinic with onset of LBP. He was on a ladder at home. He fell off the ladder and landed on his feet about a week and a half ago. Then on Sunday, he mistepped one step down when carrying a laundry basket. Within 5 minutes, his back locked up. His pain is localized in the low back. Patient denies radicular symptoms.  Onset Date: 03/17/2024- Pain started   CHARISSA: Fall    Current Condition:   Better Slowly improving since initial injury. He went to both urgent care and the ED.     Pain:  Pain Assessment: 0-10  Pain Score: 7  Pain Type: Acute pain  Location: Low back pain (L side greater than R)  Description: Sharp, shooting pain  Aggravating Factors:  Standing, Walking, and Stairs, transfers into his truck, picking the R leg up to get his shoe on.  Relieving Factors:  Rest and Sitting, heat, ice, prescription  meds    Relevant Information (PMH & Previous Tests/Imaging): CT scan lumbar spine: Broad-based disc bulge at L4-5.  Mild disc space narrowing at L5-S1.   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 He wants to get back to coaching, working without pain, and perform household tasks without difficulty.    Red Flags: Do you have any of the following? No  Fever/chills, unexplained weight changes, dizziness/fainting, unexplained change in bowel or bladder functions, unexplained malaise or muscle weakness, night pain/sweats, numbness or tingling    Objective:  Objective      03/19/24 1345   Lumbar Observation   Lumbar L/S hypomobility   Observation: Blisters observed at R side of low back from using heating pad at home a few days ago.   Lumbar Palpation/Joint Mobility Assessment   Palpation/Joint Mobility Comment Significant tightness in the L thoracic and lumbar paraspinals   Lumbar AROM   Lumbar flexion: (60°) Mod. Restrict.   Lumbar extension (25°) Min. Restrict.   Lumbar rotation right (30°) Mod. Restrict.   Lumbar rotation left (30°) Mod. Restrict.   Lumbar sidebend right (25°) Mod. Restrict.   Lumbar sidebend left (25°) Mod. Restrict.   Hip AROM   Hip AROM WFL yes   Lumbar Myotomes   R Knee Ext (L3): (5/5) 4+/5   L Knee Ext (L3): (5/5)  4+/5   R Ankle DF (L4): (5/5) 4+/5   L Ankle DF (L4): (5/5) 4+/5   Specific Lower Extremity MMT   R Iliopsoas: (5/5) 4+/5   L Iliopsoas: (5/5) 4+/5   Special Tests   Special Tests Negative yes     Posture: shoulder rounded forward, head forward, and decreased lumbar lordosis    Lower Extremity Functional Movements  Transfers:  Independent  Gait: none  Assistive Device no device    Outcome Measures:  Other Measures  Oswestry Disablity Index (MILDRED): 32%       EDUCATION:   Individual(s) Educated: Patient  Education Provided: Home exercise program, plan of care, activity modifications, pain management, and injury pathology  Handout(s) Provided: Scanned into chart  Home Program: See below  Risk and Benefits Discussed with Patient/Caregiver/Other: Yes   Patient/Caregiver Demonstrated Understanding: Yes   Plan of Care Discussed and Agreed Upon: Yes   Patient Response to Education: Patient/Caregiver verbalized understanding of information, Patient/Caregiver performed return demonstration of exercises/activities, and Patient/Caregiver asked appropriate questions    Assessment: Patient presents with signs and symptoms consistent with lumbar pain most likely due to muscle strain after fall from a ladder over a week ago and later a misstep on the bottom step in his home 3/17/2024. The patient denies radicular symptoms. This has resulted in limited participation in pain-free ADLs and inability to perform at their prior level of function including at work. The patient is most TTP along the thoracic and lumbar paraspinals. He is guarded in the L/S region. The patient demonstrates decreased ROM, core stability, posture, flexibility, and strength. Skilled PT warranted to address the above stated impairments, so the patient can perform FA's without increased pain or difficulty.    PT Assessment Results: Decreased strength, Decreased range of motion, Decreased mobility  Rehab Prognosis: Excellent    Clinical presentation:   Stable and/or uncomplicated characteristics     Plan:  Treatment/Interventions: Cryotherapy, Dry needling, Education/ Instruction, Electrical stimulation, Hot pack, Manual therapy, Neuromuscular re-education, Therapeutic exercises, Therapeutic activities  PT Plan: Skilled PT  PT Frequency: 2 times per week  Duration: 4 weeks  Onset Date:  03/17/24  Certification Period Start Date: 03/19/24  Certification Period End Date: 04/16/24  Number of Treatments Authorized: 1 of 8 (30 total per year)  Rehab Potential: Excellent  Plan of Care Agreement: Patient      Goals: Set and discussed today  Active       PT Problem       Patient will be independent with HEP.        Start:  03/19/24    Expected End:  04/02/24            Patient will demonstrate WNL L/S AROM all directions in order to perform self care and work duties without difficulty.       Start:  03/19/24    Expected End:  04/16/24            Patient will demonstrate 5/5 with B LE MMT in order to stand long periods of time at work without difficulty.       Start:  03/19/24    Expected End:  04/16/24            Patient will score </= 10% on MILDRED to improve his function.       Start:  03/19/24    Expected End:  04/16/24            Patient will report no >/= 2/10 with ambulating up to 20 minutes in the community.       Start:  03/19/24    Expected End:  04/16/24                Plan of care was developed with input and agreement by the patient    Treatment Performed:  Access Code: [email or web address]  URL: [email or web address]  Date: 03/19/2024  Prepared by: Natividad Hernandez    Exercises  - Supine Lower Trunk Rotation  - 2 x daily - 7 x weekly - 2 sets - 10 reps - 5 second hold  - Supine Posterior Pelvic Tilt  - 2 x daily - 7 x weekly - 2 sets - 10 reps - 5 second hold  - Supine Piriformis Stretch with Leg Straight  - 2 x daily - 7 x weekly - 3 sets - 1 reps - 30 second hold  - Cat Cow  - 2 x daily - 7 x weekly - 10 sets - 1 reps - 5 second hold      Natividad Hernandez, PT

## 2024-03-22 ENCOUNTER — TREATMENT (OUTPATIENT)
Dept: PHYSICAL THERAPY | Facility: HOSPITAL | Age: 44
End: 2024-03-22
Payer: COMMERCIAL

## 2024-03-22 DIAGNOSIS — M54.50 ACUTE BILATERAL LOW BACK PAIN WITHOUT SCIATICA: ICD-10-CM

## 2024-03-22 PROCEDURE — 97110 THERAPEUTIC EXERCISES: CPT | Mod: GP,CQ

## 2024-03-22 ASSESSMENT — PAIN - FUNCTIONAL ASSESSMENT: PAIN_FUNCTIONAL_ASSESSMENT: 0-10

## 2024-03-22 ASSESSMENT — PAIN SCALES - GENERAL: PAINLEVEL_OUTOF10: 2

## 2024-03-22 NOTE — PROGRESS NOTES
"  Physical Therapy Treatment    Patient Name: Cirilo Yepez Jr.  MRN: [id number]  Today's Date: 3/22/2024  Time Calculation  Start Time: 1503  Stop Time: 1546  Time Calculation (min): 43 min        PT Therapeutic Procedures Time Entry  Therapeutic Exercise Time Entry: 46                Current Problem  1. Acute bilateral low back pain without sciatica  Follow Up In Physical Therapy          General  Reason for Referral: LBP  Referred By: Nisreen Valentin PA-C  Past Medical History Relevant to Rehab: L shoulder surgery around 2005    Subjective   Current Condition:   Better  Patient reports he was able to perform the HEP without a problem. States he does get LBP with sit to stand after sitting for a prolonged period.    Performing HEP?: Yes    Precautions  Precautions  Medical Precautions: Fall precautions  Pain  Pain Assessment: 0-10  Pain Score: 2  Pain Location: Back  Pain Orientation: Lower    Objective       Treatments:    Therapeutic Exercise  Therapeutic Exercise Performed: Yes  Therapeutic Exercise Activity 1: LTR x10 5\"  Therapeutic Exercise Activity 2: Piriformis stretch x2 30\"  Therapeutic Exercise Activity 3: PPT x10 5\"  Therapeutic Exercise Activity 4: TA x10 5\"  Therapeutic Exercise Activity 5: open book x10 5\"  Therapeutic Exercise Activity 6: Bridge x10 5\"  Therapeutic Exercise Activity 7: hip abduction black x10  Therapeutic Exercise Activity 8: Anti Rotation double blue x10  Therapeutic Exercise Activity 9: cat/cow x10 5\"         EDUCATION:   Individual(s) Educated: Patient  Education Provided: HEP TA, open book, bridge, Hip Abduction with black band  Risk and Benefits Discussed with Patient/Caregiver/Other: Yes   Patient/Caregiver Demonstrated Understanding: Yes   Patient Response to Education: Patient/Caregiver verbalized understanding of information    Assessment: Pt was able to perform all TE's without c/o LBP.  Pt demonstrated proper technique with TE's with minimal cuing provided.   PT " Assessment  PT Assessment Results: Decreased strength, Decreased range of motion, Decreased mobility  Rehab Prognosis: Excellent    Plan: Continue to progress current POC as tolerated to facilitate ability to perform functional activities.   Goals:  Active       PT Problem       Patient will be independent with HEP.        Start:  03/19/24    Expected End:  04/02/24            Patient will demonstrate WNL L/S AROM all directions in order to perform self care and work duties without difficulty.       Start:  03/19/24    Expected End:  04/16/24            Patient will demonstrate 5/5 with B LE MMT in order to stand long periods of time at work without difficulty.       Start:  03/19/24    Expected End:  04/16/24            Patient will score </= 10% on MILDRED to improve his function.       Start:  03/19/24    Expected End:  04/16/24            Patient will report no >/= 2/10 with ambulating up to 20 minutes in the community.       Start:  03/19/24    Expected End:  04/16/24                 Nino Arechiga, PTA

## 2024-03-26 ENCOUNTER — TREATMENT (OUTPATIENT)
Dept: PHYSICAL THERAPY | Facility: HOSPITAL | Age: 44
End: 2024-03-26
Payer: COMMERCIAL

## 2024-03-26 DIAGNOSIS — M54.50 ACUTE BILATERAL LOW BACK PAIN WITHOUT SCIATICA: ICD-10-CM

## 2024-03-26 PROCEDURE — 97110 THERAPEUTIC EXERCISES: CPT | Mod: GP,CQ

## 2024-03-26 ASSESSMENT — PAIN SCALES - GENERAL: PAINLEVEL_OUTOF10: 0 - NO PAIN

## 2024-03-26 ASSESSMENT — PAIN - FUNCTIONAL ASSESSMENT: PAIN_FUNCTIONAL_ASSESSMENT: 0-10

## 2024-03-26 NOTE — PROGRESS NOTES
"  Physical Therapy Treatment    Patient Name: Cirilo Yepez Jr.  MRN: [id number]  Today's Date: 3/26/2024  Time Calculation  Start Time: 1457  Stop Time: 1545  Time Calculation (min): 48 min        PT Therapeutic Procedures Time Entry  Therapeutic Exercise Time Entry: 48                Current Problem  1. Acute bilateral low back pain without sciatica  Follow Up In Physical Therapy          General  Reason for Referral: LBP  Referred By: Nisreen Valentin PA-C  Past Medical History Relevant to Rehab: L shoulder surgery around 2005    Subjective   Current Condition:   Better  Patient reports he is feeling better overall. States he has some discomfort with bending, which he performs cautiously.     Performing HEP?: Yes    Precautions  Precautions  Medical Precautions: Fall precautions  Pain  Pain Assessment: 0-10  Pain Score: 0 - No pain  Pain Location: Back  Pain Orientation: Lower    Objective       Treatments:    Therapeutic Exercise  Therapeutic Exercise Performed: Yes  Therapeutic Exercise Activity 1: LTR x10 5\"  Therapeutic Exercise Activity 4: TA x10 5\"  Therapeutic Exercise Activity 6: Bridge with SB x15 5\"  Therapeutic Exercise Activity 7: side lying clam shell Green x20  Therapeutic Exercise Activity 8: Anti Rotation double blue x15  Therapeutic Exercise Activity 10: Nu Step Level 4 x5'  Therapeutic Exercise Activity 11: standing hip Abd and Ext Red x15 R/L  Therapeutic Exercise Activity 12: RDL 5# DB x10  Therapeutic Exercise Activity 13: sit to stand with OH press 7# x 20                 EDUCATION:   Individual(s) Educated: Patient  Education Provided:   Risk and Benefits Discussed with Patient/Caregiver/Other: Yes   Patient/Caregiver Demonstrated Understanding: Yes   Patient Response to Education: Patient/Caregiver verbalized understanding of information    Assessment: Pt requires cuing and demonstration to avoid flexion of the lumbar spine with RDL performed today with fair follow through demonstrated. Pt " was able to complete all strengthening and stabilization exercises without c/o LBP.   PT Assessment  PT Assessment Results: Decreased strength, Decreased range of motion, Decreased mobility  Rehab Prognosis: Excellent    Plan: Continue to progress current POC as tolerated to facilitate ability to perform functional activities.   OP PT Plan  PT Plan: Skilled PT  PT Frequency: 2 times per week  Duration: 4 weeks  Onset Date: 03/17/24  Certification Period Start Date: 03/19/24  Certification Period End Date: 04/16/24  Number of Treatments Authorized: 3 of 8 (30 total per year)    Goals:  Active       PT Problem       Patient will be independent with HEP.        Start:  03/19/24    Expected End:  04/02/24            Patient will demonstrate WNL L/S AROM all directions in order to perform self care and work duties without difficulty.       Start:  03/19/24    Expected End:  04/16/24            Patient will demonstrate 5/5 with B LE MMT in order to stand long periods of time at work without difficulty.       Start:  03/19/24    Expected End:  04/16/24            Patient will score </= 10% on MILDRED to improve his function.       Start:  03/19/24    Expected End:  04/16/24            Patient will report no >/= 2/10 with ambulating up to 20 minutes in the community.       Start:  03/19/24    Expected End:  04/16/24                 Nino Arechiga, PTA

## 2024-03-28 ENCOUNTER — TREATMENT (OUTPATIENT)
Dept: PHYSICAL THERAPY | Facility: HOSPITAL | Age: 44
End: 2024-03-28
Payer: COMMERCIAL

## 2024-03-28 DIAGNOSIS — M54.50 ACUTE BILATERAL LOW BACK PAIN WITHOUT SCIATICA: ICD-10-CM

## 2024-03-28 PROCEDURE — 97110 THERAPEUTIC EXERCISES: CPT | Mod: GP | Performed by: PHYSICAL THERAPIST

## 2024-03-28 ASSESSMENT — PAIN - FUNCTIONAL ASSESSMENT: PAIN_FUNCTIONAL_ASSESSMENT: 0-10

## 2024-03-28 NOTE — PROGRESS NOTES
"  Physical Therapy Treatment    Patient Name: Cirilo Yepez Jr.  MRN: [id number]  Today's Date: 3/28/2024  Time Calculation  Start Time: 1519  Stop Time: 1559  Time Calculation (min): 40 min    PT Therapeutic Procedures Time Entry  Therapeutic Exercise Time Entry: 40      Current Problem  1. Acute bilateral low back pain without sciatica  Follow Up In Physical Therapy          General  Reason for Referral: LBP  Referred By: Nisreen Valentin PA-C  Past Medical History Relevant to Rehab: L shoulder surgery around 2005    Subjective   Current Condition:   Better  Patient reports his back is coming along well. He is able to stand and walk longer periods at work. Bending forward is still the most bothersome, and he is still avoiding deep L/S flexion.    Performing HEP?: Yes    Precautions  Precautions  Medical Precautions: Fall precautions  Pain  Pain Assessment: 0-10  Pain Location: Back  Pain Orientation: Lower    Objective   Min. Restrict. L/S Flexion AROM    Treatments:    Therapeutic Exercise  Therapeutic Exercise Performed: Yes  Therapeutic Exercise Activity 1: Nu Step Level 4 x 6'  Therapeutic Exercise Activity 2: Anti Rotation double blue x 15  Therapeutic Exercise Activity 3: RDL 7# DB x10  Therapeutic Exercise Activity 4: sit to stand with OH press 7# x 20  Therapeutic Exercise Activity 5: Mini lunge position 7# MB slam 2 x 10  Therapeutic Exercise Activity 6: standing hip Abd and Ext Red x 15 R/L  Therapeutic Exercise Activity 7: Side steps red band at ankles x 20 ft. ea. R/L  Therapeutic Exercise Activity 8: Zig zags red t-band x 20 ft. ea. R/L  Therapeutic Exercise Activity 9: Bridge with SB x15 5\"  Therapeutic Exercise Activity 10: Bridge with SB x15 5\"  Therapeutic Exercise Activity 11: Figure 4 stretch 2 x30\" ea. R/L  Therapeutic Exercise Activity 12: Seated L/S flexion x 10      EDUCATION:   Individual(s) Educated: Patient  Education Provided: Slowly progress his L/S AROM  Handout(s) Provided: Scanned " into chart  Home Program: Continue HEP  Risk and Benefits Discussed with Patient/Caregiver/Other: Yes   Patient/Caregiver Demonstrated Understanding: Yes   Patient Response to Education: Patient/Caregiver verbalized understanding of information, Patient/Caregiver performed return demonstration of exercises/activities, and Patient/Caregiver asked appropriate questions    Assessment:   The patient is progressing well towards his PT goals. PT discussed continuing to slowly increase L/S flexion activities. He is able to perform higher level exercises without increased pain or difficulty.   PT Assessment  PT Assessment Results: Decreased strength, Decreased range of motion, Decreased mobility  Rehab Prognosis: Excellent    Plan: Continue with POC.   Continue with core stability, LE strengthening, ROM, flexibility, and balance, so the patient can perform FA's without increased pain or difficulty.     OP PT Plan  PT Plan: Skilled PT  PT Frequency: 2 times per week  Duration: 4 weeks  Onset Date: 03/17/24  Certification Period Start Date: 03/19/24  Certification Period End Date: 04/16/24  Number of Treatments Authorized: 4 of 8 (30 total per year)    Goals:  Active       PT Problem       Patient will be independent with HEP.        Start:  03/19/24    Expected End:  04/02/24            Patient will demonstrate WNL L/S AROM all directions in order to perform self care and work duties without difficulty.       Start:  03/19/24    Expected End:  04/16/24            Patient will demonstrate 5/5 with B LE MMT in order to stand long periods of time at work without difficulty.       Start:  03/19/24    Expected End:  04/16/24            Patient will score </= 10% on MILDRED to improve his function.       Start:  03/19/24    Expected End:  04/16/24            Patient will report no >/= 2/10 with ambulating up to 20 minutes in the community.       Start:  03/19/24    Expected End:  04/16/24                 Natividad Hernandez, PT

## 2024-03-28 NOTE — PROGRESS NOTES
Cirilo is a 44-year-old male reporting clinic today for evaluation of his low back pain.    His symptoms started last weekend, he fell off of a ladder onto his back.  Then this past Sunday he fell down 1 step.  He has midline low back pain that he describes as a locking up sensation.  He has pain rating into his lateral hips bilaterally, right worse than left.  He was seen in the ED yesterday and in urgent care on 3/13.  He was given a prescription for Flexeril from urgent care and Toradol from the ED.  He states he is very sensitive to medications.  He denies weakness, dragging or tripping over his feet.  He has full control of his bowel and bladder.    Family, social, and medical histories are obtained and reviewed.    ROS: All other systems have been reviewed and are negative except as previously noted in history of present illness.    Physical Exam:  Const: Well-appearing, well-nourished male in no distress.  Eyes: Normal appearing sclera and conjunctiva, no jaundice, pupils normal in appearance.  Resp: breathing comfortably, normal respiratory rate.  CV: No upper or lower extremity edema.  Musculoskeletal: Normal gait.  Lumbar ROM is supple.  Strength exam of the lower extremities reveals 5/5 strength in all major muscle groups.  Negative straight leg raise bilaterally.  Neuro: Sensation is intact and equal bilaterally. Deep tendon reflexes are normal and symmetric.  No clonus.  Skin: Intact without any lesions, normal turgor.  Psych: Alert and oriented x3, normal mood and affect.    I personally reviewed plain films and a CT of the lumbar spine from 3/13 and 3/18 respectively. There is no evidence of acute fracture or instability.  Mild degenerative changes present.    The plan is to start conservative treatment for his acute low back pain.  A referral for physical therapy was provided today.  Prescriptions for meloxicam and methocarbamol were sent to his pharmacy for inflammation muscle spasms.  He can start  the meloxicam once the Toradol is complete, he should not take any other NSAIDs while taking meloxicam.  If he does not have improvement after 6 weeks of physical therapy he should follow-up with me at which time consider an MRI of the lumbar spine.    **This note was dictated using speech recognition software and was not corrected for spelling or grammatical errors**

## 2024-04-02 ENCOUNTER — APPOINTMENT (OUTPATIENT)
Dept: PHYSICAL THERAPY | Facility: HOSPITAL | Age: 44
End: 2024-04-02

## 2024-04-02 ENCOUNTER — TELEPHONE (OUTPATIENT)
Dept: PHYSICAL THERAPY | Facility: HOSPITAL | Age: 44
End: 2024-04-02

## 2024-04-04 ENCOUNTER — APPOINTMENT (OUTPATIENT)
Dept: PHYSICAL THERAPY | Facility: HOSPITAL | Age: 44
End: 2024-04-04

## 2024-04-09 ENCOUNTER — APPOINTMENT (OUTPATIENT)
Dept: PHYSICAL THERAPY | Facility: HOSPITAL | Age: 44
End: 2024-04-09

## 2024-04-11 ENCOUNTER — APPOINTMENT (OUTPATIENT)
Dept: PHYSICAL THERAPY | Facility: HOSPITAL | Age: 44
End: 2024-04-11

## 2025-04-21 ENCOUNTER — TELEPHONE (OUTPATIENT)
Dept: PRIMARY CARE | Facility: CLINIC | Age: 45
End: 2025-04-21